# Patient Record
Sex: MALE | Race: WHITE | NOT HISPANIC OR LATINO | URBAN - METROPOLITAN AREA
[De-identification: names, ages, dates, MRNs, and addresses within clinical notes are randomized per-mention and may not be internally consistent; named-entity substitution may affect disease eponyms.]

---

## 2019-08-06 ENCOUNTER — IMPORTED ENCOUNTER (OUTPATIENT)
Dept: URBAN - METROPOLITAN AREA CLINIC 50 | Facility: CLINIC | Age: 84
End: 2019-08-06

## 2020-09-15 ENCOUNTER — IMPORTED ENCOUNTER (OUTPATIENT)
Dept: URBAN - METROPOLITAN AREA CLINIC 50 | Facility: CLINIC | Age: 85
End: 2020-09-15

## 2020-09-15 NOTE — PATIENT DISCUSSION
"""OCT MAC OU done today. Stable at this time. Amsler grid given.  Patient to call with any visual ""

## 2020-10-29 ENCOUNTER — IMPORTED ENCOUNTER (OUTPATIENT)
Dept: URBAN - METROPOLITAN AREA CLINIC 50 | Facility: CLINIC | Age: 85
End: 2020-10-29

## 2021-04-17 ASSESSMENT — VISUAL ACUITY
OS_CC: J1
OS_BAT: 20/50
OD_CC: J1
OS_CC: 20/30
OS_OTHER: 20/50. 20/70.
OD_CC: J1+(-1)@ 14 IN
OS_BAT: 20/70
OD_PH: 20/80
OS_CC: 20/30
OS_CC: J1+(-1)@ 14 IN
OS_OTHER: 20/70. 20/>400.
OD_CC: 20/80
OD_CC: 20/80

## 2021-04-17 ASSESSMENT — TONOMETRY
OS_IOP_MMHG: 8
OS_IOP_MMHG: 10
OD_IOP_MMHG: 10
OD_IOP_MMHG: 09
OD_IOP_MMHG: 18
OS_IOP_MMHG: 18

## 2021-09-15 ENCOUNTER — PREPPED CHART (OUTPATIENT)
Dept: URBAN - METROPOLITAN AREA CLINIC 50 | Facility: CLINIC | Age: 86
End: 2021-09-15

## 2021-09-21 ENCOUNTER — ANNUAL COMPREHENSIVE EXAM (OUTPATIENT)
Dept: URBAN - METROPOLITAN AREA CLINIC 50 | Facility: CLINIC | Age: 86
End: 2021-09-21

## 2021-09-21 DIAGNOSIS — E11.9: ICD-10-CM

## 2021-09-21 DIAGNOSIS — H35.3131: ICD-10-CM

## 2021-09-21 PROCEDURE — 92134 CPTRZ OPH DX IMG PST SGM RTA: CPT

## 2021-09-21 PROCEDURE — 92014 COMPRE OPH EXAM EST PT 1/>: CPT

## 2021-09-21 ASSESSMENT — VISUAL ACUITY
OD_CC: 20/80
OU_CC: J1+
OS_GLARE: 20/60
OS_CC: 20/30
OU_CC: 20/30
OS_GLARE: 20/40

## 2021-09-21 ASSESSMENT — TONOMETRY
OS_IOP_MMHG: 10
OD_IOP_MMHG: 10

## 2022-09-27 ENCOUNTER — ESTABLISHED PATIENT (OUTPATIENT)
Dept: URBAN - METROPOLITAN AREA CLINIC 50 | Facility: CLINIC | Age: 87
End: 2022-09-27

## 2022-09-27 DIAGNOSIS — H35.3131: ICD-10-CM

## 2022-09-27 DIAGNOSIS — H52.203: ICD-10-CM

## 2022-09-27 DIAGNOSIS — E11.9: ICD-10-CM

## 2022-09-27 DIAGNOSIS — H35.341: ICD-10-CM

## 2022-09-27 PROCEDURE — 92134 CPTRZ OPH DX IMG PST SGM RTA: CPT

## 2022-09-27 PROCEDURE — 92014 COMPRE OPH EXAM EST PT 1/>: CPT

## 2022-09-27 PROCEDURE — 92015 DETERMINE REFRACTIVE STATE: CPT

## 2022-09-27 ASSESSMENT — VISUAL ACUITY
OU_CC: 20/50+2
OS_GLARE: 20/70
OD_CC: 20/70
OS_CC: 20/50+1
OS_GLARE: >20/400
OU_CC: J1

## 2022-09-27 ASSESSMENT — TONOMETRY
OD_IOP_MMHG: 10
OS_IOP_MMHG: 10

## 2023-12-01 ENCOUNTER — COMPREHENSIVE EXAM (OUTPATIENT)
Dept: URBAN - METROPOLITAN AREA CLINIC 53 | Facility: CLINIC | Age: 88
End: 2023-12-01

## 2023-12-01 DIAGNOSIS — H43.812: ICD-10-CM

## 2023-12-01 DIAGNOSIS — E11.9: ICD-10-CM

## 2023-12-01 DIAGNOSIS — H35.3131: ICD-10-CM

## 2023-12-01 DIAGNOSIS — H52.4: ICD-10-CM

## 2023-12-01 DIAGNOSIS — H35.341: ICD-10-CM

## 2023-12-01 DIAGNOSIS — H26.492: ICD-10-CM

## 2023-12-01 PROCEDURE — 92014 COMPRE OPH EXAM EST PT 1/>: CPT

## 2023-12-01 PROCEDURE — 92015 DETERMINE REFRACTIVE STATE: CPT

## 2023-12-01 PROCEDURE — 92134 CPTRZ OPH DX IMG PST SGM RTA: CPT

## 2023-12-01 ASSESSMENT — VISUAL ACUITY
OU_CC: J1+@16"
OS_GLARE: 20/25
OS_CC: 20/60
OS_PH: 20/30
OD_PH: 20/60
OS_GLARE: 20/30
OD_CC: 20/100

## 2023-12-01 ASSESSMENT — TONOMETRY
OS_IOP_MMHG: 16
OD_IOP_MMHG: 15

## 2023-12-18 ENCOUNTER — DIAGNOSTICS ONLY (OUTPATIENT)
Dept: URBAN - METROPOLITAN AREA CLINIC 53 | Facility: CLINIC | Age: 88
End: 2023-12-18

## 2023-12-18 DIAGNOSIS — H53.8: ICD-10-CM

## 2023-12-18 PROCEDURE — 92083 EXTENDED VISUAL FIELD XM: CPT

## 2024-06-27 ENCOUNTER — COMPREHENSIVE EXAM (OUTPATIENT)
Dept: URBAN - METROPOLITAN AREA CLINIC 50 | Facility: LOCATION | Age: 89
End: 2024-06-27

## 2024-06-27 DIAGNOSIS — H52.4: ICD-10-CM

## 2024-06-27 DIAGNOSIS — H35.3131: ICD-10-CM

## 2024-06-27 DIAGNOSIS — H04.123: ICD-10-CM

## 2024-06-27 DIAGNOSIS — E11.9: ICD-10-CM

## 2024-06-27 DIAGNOSIS — H53.8: ICD-10-CM

## 2024-06-27 DIAGNOSIS — H43.812: ICD-10-CM

## 2024-06-27 DIAGNOSIS — H26.492: ICD-10-CM

## 2024-06-27 DIAGNOSIS — H35.341: ICD-10-CM

## 2024-06-27 PROCEDURE — 92015 DETERMINE REFRACTIVE STATE: CPT

## 2024-06-27 PROCEDURE — 92134 CPTRZ OPH DX IMG PST SGM RTA: CPT

## 2024-06-27 PROCEDURE — 99214 OFFICE O/P EST MOD 30 MIN: CPT

## 2024-06-27 ASSESSMENT — KERATOMETRY
OS_AXISANGLE2_DEGREES: 178
OD_K1POWER_DIOPTERS: 42.00
OS_K2POWER_DIOPTERS: 44.75
OS_K1POWER_DIOPTERS: 42.25
OS_AXISANGLE_DEGREES: 088
OD_K2POWER_DIOPTERS: 44.25
OD_AXISANGLE2_DEGREES: 1
OD_AXISANGLE_DEGREES: 091

## 2024-06-27 ASSESSMENT — TONOMETRY
OS_IOP_MMHG: 14
OD_IOP_MMHG: 13

## 2024-06-27 ASSESSMENT — VISUAL ACUITY
OS_CC: 20/50-2
OU_CC: 20/50+1
OU_CC: J2@16"
OD_CC: 20/70
OD_PH: 20/60-1
OS_GLARE: 20/200
OS_GLARE: 20/80